# Patient Record
Sex: FEMALE | Race: WHITE
[De-identification: names, ages, dates, MRNs, and addresses within clinical notes are randomized per-mention and may not be internally consistent; named-entity substitution may affect disease eponyms.]

---

## 2021-09-17 ENCOUNTER — HOSPITAL ENCOUNTER (OUTPATIENT)
Dept: HOSPITAL 61 - 3 SO LND | Age: 31
Setting detail: OBSERVATION
Discharge: HOME | End: 2021-09-17
Attending: OBSTETRICS & GYNECOLOGY | Admitting: OBSTETRICS & GYNECOLOGY
Payer: MEDICAID

## 2021-09-17 DIAGNOSIS — Z3A.23: ICD-10-CM

## 2021-09-17 DIAGNOSIS — R19.7: ICD-10-CM

## 2021-09-17 DIAGNOSIS — Z20.822: ICD-10-CM

## 2021-09-17 DIAGNOSIS — R51.9: ICD-10-CM

## 2021-09-17 DIAGNOSIS — O26.892: Primary | ICD-10-CM

## 2021-09-17 LAB
AMORPH SED URNS QL MICRO: PRESENT /HPF
AMPHETAMINE/METHAMPHETAMINE: (no result)
APTT PPP: YELLOW S
BACTERIA #/AREA URNS HPF: (no result) /HPF
BARBITURATES UR-MCNC: (no result) UG/ML
BENZODIAZ UR-MCNC: (no result) UG/L
BILIRUB UR QL STRIP: NEGATIVE
CANNABINOIDS UR-MCNC: (no result) UG/L
COCAINE UR-MCNC: (no result) NG/ML
FIBRINOGEN PPP-MCNC: (no result) MG/DL
METHADONE SERPL-MCNC: (no result) NG/ML
NITRITE UR QL STRIP: NEGATIVE
OPIATES UR-MCNC: (no result) NG/ML
PCP SERPL-MCNC: (no result) MG/DL
PH UR STRIP: 8 [PH]
PROT UR STRIP-MCNC: NEGATIVE MG/DL
RBC #/AREA URNS HPF: 0 /HPF (ref 0–2)
UROBILINOGEN UR-MCNC: 1 MG/DL
WBC #/AREA URNS HPF: (no result) /HPF (ref 0–4)

## 2021-09-17 PROCEDURE — U0003 INFECTIOUS AGENT DETECTION BY NUCLEIC ACID (DNA OR RNA); SEVERE ACUTE RESPIRATORY SYNDROME CORONAVIRUS 2 (SARS-COV-2) (CORONAVIRUS DISEASE [COVID-19]), AMPLIFIED PROBE TECHNIQUE, MAKING USE OF HIGH THROUGHPUT TECHNOLOGIES AS DESCRIBED BY CMS-2020-01-R: HCPCS

## 2021-09-17 PROCEDURE — G0378 HOSPITAL OBSERVATION PER HR: HCPCS

## 2021-09-17 PROCEDURE — G0379 DIRECT REFER HOSPITAL OBSERV: HCPCS

## 2021-09-17 PROCEDURE — 80307 DRUG TEST PRSMV CHEM ANLYZR: CPT

## 2021-09-17 PROCEDURE — 59025 FETAL NON-STRESS TEST: CPT

## 2021-09-17 PROCEDURE — 96365 THER/PROPH/DIAG IV INF INIT: CPT

## 2021-09-17 PROCEDURE — 81001 URINALYSIS AUTO W/SCOPE: CPT

## 2021-09-17 PROCEDURE — 87086 URINE CULTURE/COLONY COUNT: CPT

## 2021-09-17 PROCEDURE — 87426 SARSCOV CORONAVIRUS AG IA: CPT

## 2021-10-08 ENCOUNTER — HOSPITAL ENCOUNTER (EMERGENCY)
Dept: HOSPITAL 61 - ER | Age: 31
Discharge: HOME | End: 2021-10-08
Payer: MEDICAID

## 2021-10-08 VITALS — DIASTOLIC BLOOD PRESSURE: 61 MMHG | SYSTOLIC BLOOD PRESSURE: 100 MMHG

## 2021-10-08 VITALS — HEIGHT: 64 IN | WEIGHT: 123.46 LBS | BODY MASS INDEX: 21.08 KG/M2

## 2021-10-08 DIAGNOSIS — Z3A.24: ICD-10-CM

## 2021-10-08 DIAGNOSIS — E86.0: ICD-10-CM

## 2021-10-08 DIAGNOSIS — O99.282: Primary | ICD-10-CM

## 2021-10-08 LAB
ALBUMIN SERPL-MCNC: 2.5 G/DL (ref 3.4–5)
ALBUMIN/GLOB SERPL: 0.7 {RATIO} (ref 1–1.7)
ALP SERPL-CCNC: 107 U/L (ref 46–116)
ALT SERPL-CCNC: 19 U/L (ref 14–59)
AMPHETAMINE/METHAMPHETAMINE: (no result)
ANION GAP SERPL CALC-SCNC: 15 MMOL/L (ref 6–14)
APTT PPP: YELLOW S
AST SERPL-CCNC: 12 U/L (ref 15–37)
BACTERIA #/AREA URNS HPF: 0 /HPF
BARBITURATES UR-MCNC: (no result) UG/ML
BASOPHILS # BLD AUTO: 0 X10^3/UL (ref 0–0.2)
BASOPHILS NFR BLD: 0 % (ref 0–3)
BENZODIAZ UR-MCNC: (no result) UG/L
BILIRUB SERPL-MCNC: 0.2 MG/DL (ref 0.2–1)
BILIRUB UR QL STRIP: NEGATIVE
BUN SERPL-MCNC: 4 MG/DL (ref 7–20)
BUN/CREAT SERPL: 5 (ref 6–20)
CALCIUM SERPL-MCNC: 8.4 MG/DL (ref 8.5–10.1)
CANNABINOIDS UR-MCNC: (no result) UG/L
CHLORIDE SERPL-SCNC: 102 MMOL/L (ref 98–107)
CO2 SERPL-SCNC: 20 MMOL/L (ref 21–32)
COCAINE UR-MCNC: (no result) NG/ML
CREAT SERPL-MCNC: 0.8 MG/DL (ref 0.6–1)
EOSINOPHIL NFR BLD: 0.1 X10^3/UL (ref 0–0.7)
EOSINOPHIL NFR BLD: 1 % (ref 0–3)
ERYTHROCYTE [DISTWIDTH] IN BLOOD BY AUTOMATED COUNT: 13.1 % (ref 11.5–14.5)
FIBRINOGEN PPP-MCNC: CLEAR MG/DL
GFR SERPLBLD BASED ON 1.73 SQ M-ARVRAT: 84.2 ML/MIN
GLUCOSE SERPL-MCNC: 123 MG/DL (ref 70–99)
HCT VFR BLD CALC: 28.4 % (ref 36–47)
HGB BLD-MCNC: 9.6 G/DL (ref 12–15.5)
LYMPHOCYTES # BLD: 1.8 X10^3/UL (ref 1–4.8)
LYMPHOCYTES NFR BLD AUTO: 18 % (ref 24–48)
MCH RBC QN AUTO: 29 PG (ref 25–35)
MCHC RBC AUTO-ENTMCNC: 34 G/DL (ref 31–37)
MCV RBC AUTO: 85 FL (ref 79–100)
METHADONE SERPL-MCNC: (no result) NG/ML
MONO #: 0.5 X10^3/UL (ref 0–1.1)
MONOCYTES NFR BLD: 5 % (ref 0–9)
NEUT #: 7.8 X10^3/UL (ref 1.8–7.7)
NEUTROPHILS NFR BLD AUTO: 76 % (ref 31–73)
NITRITE UR QL STRIP: NEGATIVE
OPIATES UR-MCNC: (no result) NG/ML
PCP SERPL-MCNC: (no result) MG/DL
PH UR STRIP: 6 [PH]
PLATELET # BLD AUTO: 351 X10^3/UL (ref 140–400)
POTASSIUM SERPL-SCNC: 3.5 MMOL/L (ref 3.5–5.1)
PROT SERPL-MCNC: 6.2 G/DL (ref 6.4–8.2)
PROT UR STRIP-MCNC: NEGATIVE MG/DL
RBC # BLD AUTO: 3.34 X10^6/UL (ref 3.5–5.4)
RBC #/AREA URNS HPF: 0 /HPF (ref 0–2)
SODIUM SERPL-SCNC: 137 MMOL/L (ref 136–145)
UROBILINOGEN UR-MCNC: 0.2 MG/DL
WBC # BLD AUTO: 10.3 X10^3/UL (ref 4–11)
WBC #/AREA URNS HPF: (no result) /HPF (ref 0–4)

## 2021-10-08 PROCEDURE — 81001 URINALYSIS AUTO W/SCOPE: CPT

## 2021-10-08 PROCEDURE — 85025 COMPLETE CBC W/AUTO DIFF WBC: CPT

## 2021-10-08 PROCEDURE — 80307 DRUG TEST PRSMV CHEM ANLYZR: CPT

## 2021-10-08 PROCEDURE — 99284 EMERGENCY DEPT VISIT MOD MDM: CPT

## 2021-10-08 PROCEDURE — 96361 HYDRATE IV INFUSION ADD-ON: CPT

## 2021-10-08 PROCEDURE — 93005 ELECTROCARDIOGRAM TRACING: CPT

## 2021-10-08 PROCEDURE — 80053 COMPREHEN METABOLIC PANEL: CPT

## 2021-10-08 PROCEDURE — 96360 HYDRATION IV INFUSION INIT: CPT

## 2021-10-08 PROCEDURE — 82962 GLUCOSE BLOOD TEST: CPT

## 2021-10-08 PROCEDURE — 36415 COLL VENOUS BLD VENIPUNCTURE: CPT

## 2021-10-08 NOTE — EKG
[facility]

              [address] [place] [address]

Test Date:    2021-10-08               Test Time:    15:07:36

Pat Name:     PADMINI SPAULDING          Department:   

Patient ID:   [id number]           Room:          

Gender:       F                        Technician:   

:          1990               Requested By: FLORENCE MARMOLEJO

Order Number: [id number]           Reading MD:   Dong Mccormack

                                 Measurements

Intervals                              Axis          

Rate:         110                      P:            26

ME:           140                      QRS:          64

QRSD:         70                       T:            35

QT:           326                                    

QTc:          447                                    

                           Interpretive Statements

SINUS TACHYCARDIA

Electronically Signed On 10- 16:47:42 CDT by Dong Mccormack

## 2021-10-08 NOTE — PHYS DOC
Past Medical History


Past Medical History:  No Pertinent History


Past Surgical History:  No Surgical History


Smoking Status:  Never Smoker


Alcohol Use:  None


Drug Use:  None





General Adult


EDM:


Chief Complaint:  SYNCOPE





HPI:


HPI:


30-year-old G3, P2 approximately 6 months pregnant female presents to the 

emergency department complaining of syncopal episode that occurred earlier 

today.  She states that she was walking after going to the bathroom when she 

passed out.  She states now that she feels weak.  There is no seizure-like 

activity witnessed.  Patient otherwise feels like in her normal health.  She 

denies any drugs or alcohol.  She moved from [place] and saw an OB/GYN in that 

state and states that she had a normal ultrasound and prenatal care prior to 

moving to [place].  The patient denies nausea, vomiting, fever, chills, chest 

pain, shortness of breath, abdominal pain, urinary symptoms, cough, recent 

trauma, or any other complaints.





Review of Systems:


Review of Systems:


ROS otherwise negative except for what was mentioned in HPI





Heart Score:


C/O Chest Pain:  No





Current Medications:





Current Medications








 Medications


  (Trade)  Dose


 Ordered  Sig/Lul  Start Time


 Stop Time Status Last Admin


Dose Admin


 


 Sodium Chloride  1,000 ml @ 


 1,000 mls/hr  1X  ONCE  10/8/21 15:30


 10/8/21 16:29   














Allergies:


Allergies:





Allergies








Coded Allergies Type Severity Reaction Last Updated Verified


 


  No Known Drug Allergies    9/17/21 No











Physical Exam:


PE:


Constitutional: Disheveled, no acute distress.


HENT: Atraumatic, bilateral external ears normal, nose normal.


Eyes: PERRLA, EOMI, conjunctiva normal, no discharge.


Neck: Normal range of motion, supple, no stridor.


Cardiovascular: Heart rate regular rhythm. 2+ radial pulses


Lungs & Thorax: No respiratory distress, symmetrical expansion.  Bilateral 

breath sounds clear to auscultation


Abdomen: Soft, no tenderness.  Gravid uterus


Skin: Warm, dry.


Extremities: No tenderness, no cyanosis, ROM intact, no edema.


Neurologic: Alert and oriented X 3, normal motor function, normal sensory 

function, no focal deficits noted. Non ataxic gait. GCS 15.


Psychologic: Affect normal, judgment normal, mood normal.





Current Patient Data:


Labs:





Laboratory Tests








Test


 10/8/21


15:12 10/8/21


15:22 10/8/21


16:32


 


White Blood Count


 10.3 x10^3/uL


(4.0-11.0) 


 





 


Red Blood Count


 3.34 x10^6/uL


(3.50-5.40) 


 





 


Hemoglobin


 9.6 g/dL


(12.0-15.5) 


 





 


Hematocrit


 28.4 %


(36.0-47.0) 


 





 


Mean Corpuscular Volume 85 fL ()   


 


Mean Corpuscular Hemoglobin 29 pg (25-35)   


 


Mean Corpuscular Hemoglobin


Concent 34 g/dL


(31-37) 


 





 


Red Cell Distribution Width


 13.1 %


(11.5-14.5) 


 





 


Platelet Count


 351 x10^3/uL


(140-400) 


 





 


Neutrophils (%) (Auto) 76 % (31-73)   


 


Lymphocytes (%) (Auto) 18 % (24-48)   


 


Monocytes (%) (Auto) 5 % (0-9)   


 


Eosinophils (%) (Auto) 1 % (0-3)   


 


Basophils (%) (Auto) 0 % (0-3)   


 


Neutrophils # (Auto)


 7.8 x10^3/uL


(1.8-7.7) 


 





 


Lymphocytes # (Auto)


 1.8 x10^3/uL


(1.0-4.8) 


 





 


Monocytes # (Auto)


 0.5 x10^3/uL


(0.0-1.1) 


 





 


Eosinophils # (Auto)


 0.1 x10^3/uL


(0.0-0.7) 


 





 


Basophils # (Auto)


 0.0 x10^3/uL


(0.0-0.2) 


 





 


Sodium Level


 137 mmol/L


(136-145) 


 





 


Potassium Level


 3.5 mmol/L


(3.5-5.1) 


 





 


Chloride Level


 102 mmol/L


() 


 





 


Carbon Dioxide Level


 20 mmol/L


(21-32) 


 





 


Anion Gap 15 (6-14)   


 


Blood Urea Nitrogen 4 mg/dL (7-20)   


 


Creatinine


 0.8 mg/dL


(0.6-1.0) 


 





 


Estimated GFR


(Cockcroft-Gault) 84.2 


 


 





 


BUN/Creatinine Ratio 5 (6-20)   


 


Glucose Level


 123 mg/dL


(70-99) 


 





 


Calcium Level


 8.4 mg/dL


(8.5-10.1) 


 





 


Total Bilirubin


 0.2 mg/dL


(0.2-1.0) 


 





 


Aspartate Amino Transf


(AST/SGOT) 12 U/L (15-37) 


 


 





 


Alanine Aminotransferase


(ALT/SGPT) 19 U/L (14-59) 


 


 





 


Alkaline Phosphatase


 107 U/L


() 


 





 


Total Protein


 6.2 g/dL


(6.4-8.2) 


 





 


Albumin


 2.5 g/dL


(3.4-5.0) 


 





 


Albumin/Globulin Ratio 0.7 (1.0-1.7)   


 


Ethyl Alcohol Level


 < 10 mg/dL


(0-10) 


 





 


Glucose (Fingerstick)


 


 114 mg/dL


(70-99) 





 


Urine Collection Type   Unknown 


 


Urine Color   Yellow 


 


Urine Clarity   Clear 


 


Urine pH   6.0 (<5.0-8.0) 


 


Urine Specific Gravity


 


 


 1.025


(1.000-1.030)


 


Urine Protein


 


 


 Negative mg/dL


(NEG-TRACE)


 


Urine Glucose (UA)


 


 


 Negative mg/dL


(NEG)


 


Urine Ketones (Stick)


 


 


 Negative mg/dL


(NEG)


 


Urine Blood   Negative (NEG) 


 


Urine Nitrite   Negative (NEG) 


 


Urine Bilirubin   Negative (NEG) 


 


Urine Urobilinogen Dipstick


 


 


 0.2 mg/dL (0.2


mg/dL)


 


Urine Leukocyte Esterase   Negative (NEG) 


 


Urine RBC   0 /HPF (0-2) 


 


Urine WBC   1-4 /HPF (0-4) 


 


Urine Squamous Epithelial


Cells 


 


 Mod /LPF 





 


Urine Bacteria   0 /HPF (0-FEW) 


 


Urine Mucus   Marked /LPF 


 


Urine Opiates Screen   Neg (NEG) 


 


Urine Methadone Screen   Neg (NEG) 


 


Urine Barbiturates   Neg (NEG) 


 


Urine Phencyclidine Screen   Neg (NEG) 


 


Urine


Amphetamine/Methamphetamine 


 


 Neg (NEG) 





 


Urine Benzodiazepines Screen   Neg (NEG) 


 


Urine Cocaine Screen   Neg (NEG) 


 


Urine Cannabinoids Screen   Neg (NEG) 


 


Urine Ethyl Alcohol   Neg (NEG) 








Vital Signs:





Vital Signs








  Date Time  Temp Pulse Resp B/P (MAP) Pulse Ox O2 Delivery O2 Flow Rate FiO2


 


10/8/21 16:04  88 16 108/66 (80) 98 Room Air  


 


10/8/21 15:34  88 16 110/74 (86) 98   


 


10/8/21 15:05 98.4 104  115/82 (93)    





 98.4       











Course & Med Decision Making:


Course & Med Decision Making


FHT: 142





patient much improved after 2L IVF, likely dehydrated, states she is going to 

follow up with tony randhawa, vitals improved, no bacteruria. will dc for 

patient to follow with tony. return precautions discussed. IUP seen on bedside 

US.








My Orders - BROWN,VANESSA M DO








Procedure Category Date Status





  Time 


 


Cbc W Autodiff LAB 10/8/21 Complete





  15:18 


 


Comprehensive LAB 10/8/21 Complete





Metabolic Panel  15:18 


 


Ua, Cult If Indicated LAB 10/8/21 Complete





  15:18 


 


Iv Normal Saline PHA 10/8/21 Complete





1000ml Bag (Iv Sodium  15:30 


 


Drugs Of Abuse Ur LAB 10/8/21 Complete





  15:21 


 


Ethanol LAB 10/8/21 Complete





  15:21 


 


Iv Normal Saline PHA 10/8/21 In Process





1000ml Bag (Iv Sodium  16:45 











Departure


Departure


Impression:  


   Primary Impression:  


   Dehydration


Disposition:  01 HOME / SELF CARE / HOMELESS


Condition:  STABLE


Referrals:  


NO PCP (PCP)


Patient Instructions:  Dehydration, Adult, Easy-to-Read





Additional Instructions:  


You were seen in the emergency department and your health condition was deemed 

not to require admission to the hospital.  It is important to realize that we 

can only evaluate you during the time that you are in her department.  

Occasionally health conditions can worsen upon leaving the emergency department.

 If this were to happen, please return to and allow us the opportunity to 

reevaluate you.  It is a pleasure to take care of your health needs.  Return to 

the ER if your symptoms worsen, do not improve, or if you develop additional 

symptoms that are concerning to you











VANESSA MACHUCA DO              Oct 8, 2021 15:24

## 2021-10-31 ENCOUNTER — HOSPITAL ENCOUNTER (EMERGENCY)
Dept: HOSPITAL 61 - ER | Age: 31
Discharge: HOME | End: 2021-10-31
Payer: MEDICAID

## 2021-10-31 VITALS
DIASTOLIC BLOOD PRESSURE: 83 MMHG | DIASTOLIC BLOOD PRESSURE: 83 MMHG | DIASTOLIC BLOOD PRESSURE: 83 MMHG | SYSTOLIC BLOOD PRESSURE: 134 MMHG | DIASTOLIC BLOOD PRESSURE: 83 MMHG | SYSTOLIC BLOOD PRESSURE: 134 MMHG | SYSTOLIC BLOOD PRESSURE: 134 MMHG | SYSTOLIC BLOOD PRESSURE: 134 MMHG

## 2021-10-31 VITALS — BODY MASS INDEX: 23.18 KG/M2 | HEIGHT: 64 IN | WEIGHT: 135.8 LBS

## 2021-10-31 DIAGNOSIS — K02.9: ICD-10-CM

## 2021-10-31 DIAGNOSIS — K00.7: Primary | ICD-10-CM

## 2021-10-31 DIAGNOSIS — K04.7: ICD-10-CM

## 2021-10-31 PROCEDURE — 99283 EMERGENCY DEPT VISIT LOW MDM: CPT

## 2021-10-31 NOTE — PHYS DOC
Past Medical History


Past Medical History:  Other


Additional Past Medical Histor:  fibromyalgia


Past Surgical History:  No Surgical History


Smoking Status:  Never Smoker


Alcohol Use:  None


Drug Use:  None





General Adult


EDM:


Chief Complaint:  DENTAL PROBLEM





HPI:


HPI:





Patient is a 30 year old A0 female who presents with 2-day history of right-

sided dental pain.  Patient rates her pain 10/10 radiating throughout the entire

right side of her face.  Patient states that she has had dental problems for 

several years, but has been a year since has been this bad.  Patient reports she

moved to the area 1 month ago from [facility].  Patient does not currently 

have dental care.  Patient denies fever, chills, eye pain, discharge, neck 

swelling, difficulty swallowing, difficulty breathing, chest pain, palpitations.

 Patient has no other complaints at this time.





Review of Systems:


Review of Systems:


12 systems reviewed.  ROS negative except as mentioned in HPI.





Heart Score:


C/O Chest Pain:  No





Current Medications:





Current Medications








 Medications


  (Trade)  Dose


 Ordered  Sig/Lul  Start Time


 Stop Time Status Last Admin


Dose Admin


 


 Acetaminophen


  (Tylenol)  650 mg  1X  ONCE  10/31/21 10:15


 10/31/21 10:16 DC 10/31/21 10:25


650 MG


 


 Lidocaine HCl


  (Viscous


 Lidocaine)  15 ml  1X  ONCE  10/31/21 10:15


 10/31/21 10:16 DC 10/31/21 10:25


15 ML











Allergies:


Allergies:





Allergies








Coded Allergies Type Severity Reaction Last Updated Verified


 


  No Known Drug Allergies    21 No











Physical Exam:


PE:





Constitutional: Well developed, well nourished, non-toxic appearance. 


HENT: Normocephalic, atraumatic, bilateral external ears normal, oropharynx 

moist, very poor dentition, right upper gums swollen and erythematous, nose 

normal.


Eyes: PERRLA, EOMI, conjunctiva normal, no discharge. 


Neck: Normal range of motion, no tenderness, supple, no stridor. 


Cardiovascular: Heart rate regular rhythm, no murmur.


Lungs & Thorax: Bilateral breath sounds clear to auscultation.





Current Patient Data:


Vital Signs:





                                   Vital Signs








  Date Time  Temp Pulse Resp B/P (MAP) Pulse Ox O2 Delivery O2 Flow Rate FiO2


 


10/31/21 09:57 97.4 74 17 134/83 (100) 98 Room Air  





 97.4       











Course & Med Decision Making:


Course & Med Decision Making


Pertinent Labs and Imaging studies reviewed. (See chart for details)





Patient's dental pain is chronic in nature.  At this time, there is no 

neck/throat or orbital involvement.  Due to patient's gravid status, pain 

management will be topical viscous lidocaine and Tylenol.





On reevaluation, patient states that her pain has improved. Patient will be 

provided with a list of dental clinics in the area.  Scripts were sent for 

viscous lidocaine as well as Augmentin.  Patient understands and is agreeable to

discharge plan.





Dragon Disclaimer:


Dragon Disclaimer:


This electronic medical record was generated, in whole or in part, using a voice

recognition dictation system.





Departure


Departure


Impression:  


   Primary Impression:  


   Dental abscess


   Additional Impressions:  


   Poor dentition


   Chronic enamel dental caries


Disposition:  01 HOME / SELF CARE / HOMELESS


Condition:  STABLE


Referrals:  


NO PCP (PCP)


Patient Instructions:  Dental Pain, Easy-to-Read





Additional Instructions:  


You may continue to take acetaminophen according to box instructions.  Do not 

take more than 3000 mg in a day.  Please return to the emergency department if 

your pain is uncontrolled at home.


Scripts


Benzocaine/Menthol/Zinc Chlor (Orajel 3X Toothache-Gum Gel) 11.9 Gm Gel..gram.


11.9 GM MM PRN, #1 BOTTLE


   Prov: FLORENCE MARMOLEJO         10/31/21 


Amoxicillin/Potassium Clav (AUGMENTIN 875-125 TABLET) 1 Each Tablet


1 TAB PO BID for 7 Days, #14 TAB 0 Refills


   Please take full course of antibiotics.


   Prov: FLORENCE MARMOLEJO         10/31/21











FLROENCE MARMOLEJO              Oct 31, 2021 10:47

## 2021-11-13 ENCOUNTER — HOSPITAL ENCOUNTER (OUTPATIENT)
Dept: HOSPITAL 61 - 3 SO LND | Age: 31
Setting detail: OBSERVATION
LOS: 1 days | Discharge: HOME | End: 2021-11-14
Attending: OBSTETRICS & GYNECOLOGY | Admitting: ADVANCED PRACTICE MIDWIFE
Payer: MEDICAID

## 2021-11-13 VITALS — BODY MASS INDEX: 23.79 KG/M2 | WEIGHT: 139.33 LBS | HEIGHT: 64 IN

## 2021-11-13 DIAGNOSIS — O99.891: Primary | ICD-10-CM

## 2021-11-13 DIAGNOSIS — Z79.899: ICD-10-CM

## 2021-11-13 DIAGNOSIS — Z3A.31: ICD-10-CM

## 2021-11-13 DIAGNOSIS — M54.50: ICD-10-CM

## 2021-11-13 DIAGNOSIS — R10.30: ICD-10-CM

## 2021-11-13 LAB
AMORPH SED URNS QL MICRO: PRESENT /HPF
AMPHETAMINE/METHAMPHETAMINE: (no result)
APTT PPP: YELLOW S
BACTERIA #/AREA URNS HPF: 0 /HPF
BARBITURATES UR-MCNC: (no result) UG/ML
BENZODIAZ UR-MCNC: (no result) UG/L
BILIRUB UR QL STRIP: NEGATIVE
CANNABINOIDS UR-MCNC: (no result) UG/L
COCAINE UR-MCNC: (no result) NG/ML
FIBRINOGEN PPP-MCNC: CLEAR MG/DL
METHADONE SERPL-MCNC: (no result) NG/ML
NITRITE UR QL STRIP: NEGATIVE
OPIATES UR-MCNC: (no result) NG/ML
PCP SERPL-MCNC: (no result) MG/DL
PH UR STRIP: 6 [PH]
PROT UR STRIP-MCNC: 30 MG/DL
RBC #/AREA URNS HPF: 0 /HPF (ref 0–2)
UROBILINOGEN UR-MCNC: 1 MG/DL
WBC #/AREA URNS HPF: (no result) /HPF (ref 0–4)

## 2021-11-13 PROCEDURE — 81001 URINALYSIS AUTO W/SCOPE: CPT

## 2021-11-13 PROCEDURE — G0379 DIRECT REFER HOSPITAL OBSERV: HCPCS

## 2021-11-13 PROCEDURE — G0378 HOSPITAL OBSERVATION PER HR: HCPCS

## 2021-11-13 PROCEDURE — 80307 DRUG TEST PRSMV CHEM ANLYZR: CPT

## 2021-11-13 PROCEDURE — 87086 URINE CULTURE/COLONY COUNT: CPT

## 2021-11-13 PROCEDURE — 59025 FETAL NON-STRESS TEST: CPT

## 2021-12-10 ENCOUNTER — HOSPITAL ENCOUNTER (OUTPATIENT)
Dept: HOSPITAL 61 - 3 SO LND | Age: 31
Setting detail: OBSERVATION
LOS: 1 days | Discharge: HOME | End: 2021-12-11
Attending: ADVANCED PRACTICE MIDWIFE | Admitting: ADVANCED PRACTICE MIDWIFE
Payer: MEDICAID

## 2021-12-10 ENCOUNTER — HOSPITAL ENCOUNTER (OUTPATIENT)
Dept: HOSPITAL 61 - 3 SO LND | Age: 31
Setting detail: OBSERVATION
Discharge: HOME | End: 2021-12-10
Attending: ADVANCED PRACTICE MIDWIFE | Admitting: ADVANCED PRACTICE MIDWIFE
Payer: MEDICAID

## 2021-12-10 DIAGNOSIS — O26.893: ICD-10-CM

## 2021-12-10 DIAGNOSIS — Z3A.35: ICD-10-CM

## 2021-12-10 DIAGNOSIS — R10.2: ICD-10-CM

## 2021-12-10 DIAGNOSIS — R10.30: ICD-10-CM

## 2021-12-10 DIAGNOSIS — O26.893: Primary | ICD-10-CM

## 2021-12-10 LAB
AMNIO PT: NEGATIVE
AMORPH SED URNS QL MICRO: PRESENT /HPF
APTT PPP: YELLOW S
BACTERIA #/AREA URNS HPF: (no result) /HPF
BILIRUB UR QL STRIP: NEGATIVE
FIBRINOGEN PPP-MCNC: CLEAR MG/DL
NITRITE UR QL STRIP: NEGATIVE
PH UR STRIP: 7 [PH]
PROT UR STRIP-MCNC: 30 MG/DL
RBC #/AREA URNS HPF: 0 /HPF (ref 0–2)
UROBILINOGEN UR-MCNC: 1 MG/DL
WBC #/AREA URNS HPF: (no result) /HPF (ref 0–4)

## 2021-12-10 PROCEDURE — 96361 HYDRATE IV INFUSION ADD-ON: CPT

## 2021-12-10 PROCEDURE — 59025 FETAL NON-STRESS TEST: CPT

## 2021-12-10 PROCEDURE — 84112 EVAL AMNIOTIC FLUID PROTEIN: CPT

## 2021-12-10 PROCEDURE — 81001 URINALYSIS AUTO W/SCOPE: CPT

## 2021-12-10 PROCEDURE — 96374 THER/PROPH/DIAG INJ IV PUSH: CPT

## 2021-12-10 PROCEDURE — G0379 DIRECT REFER HOSPITAL OBSERV: HCPCS

## 2021-12-10 PROCEDURE — 76819 FETAL BIOPHYS PROFIL W/O NST: CPT

## 2021-12-10 PROCEDURE — G0378 HOSPITAL OBSERVATION PER HR: HCPCS

## 2021-12-10 PROCEDURE — 76815 OB US LIMITED FETUS(S): CPT

## 2021-12-10 PROCEDURE — 87086 URINE CULTURE/COLONY COUNT: CPT

## 2021-12-10 PROCEDURE — 36415 COLL VENOUS BLD VENIPUNCTURE: CPT

## 2021-12-11 NOTE — RAD
INDICATION:  Reason: NEED BPP / Spl. Instructions:  / History:  Abdominal pain.



COMPARISON: One day prior



TECHNIQUE:  Focused ultrasound was performed of the uterus in order to obtain a fetal biophysical pro
file.  Please note that this is not a complete fetal anatomic survey.



FINDINGS: 



Fetal Breathin





Gross Body Movement: 2





Fetal Tone: 2





Amniotic Fluid Volume 2. 

Cephalic presentation at time of exam.

Amniotic fluid index is 11.8

Heart beat is 122



IMPRESSION: 



Fetal biophysical profile score is 6 out of 8.



Electronically signed by: Mathieu Calderón MD (2021 5:01 AM) [id number]

## 2021-12-11 NOTE — RAD
INDICATION:  Reason: ABDOMINAL PAIN / Spl. Instructions:  / History: 



COMPARISON: None.



FINDINGS:

Limited focused ultrasound images are obtained of the uterus.



Intrauterine pregnancy is seen with fetal movement and cardiac activity with heart beat of 135.

Cephalic presentation at time of exam.

Amniotic fluid index is 12.9.

Anterior placenta.

Estimated gestational age is 34 weeks and 6 days with estimated due date of 1/16/2022

Estimated fetal weight 2440 g, 33rd percentile

Head circumference is on the 23rd percentile

Abdominal circumference 29th percentile

Biparietal diameter 48 percent

Femur length 17 percent



IMPRESSION:



*   Intrauterine pregnancy with positive heartbeat and estimated gestational age of 34 weeks and 6 da
ys.



Electronically signed by: Mathieu Calderón MD (12/11/2021 3:04 AM) [id number]

## 2022-01-10 ENCOUNTER — HOSPITAL ENCOUNTER (INPATIENT)
Dept: HOSPITAL 61 - 3 SO LND | Age: 32
LOS: 2 days | Discharge: HOME | End: 2022-01-12
Attending: OBSTETRICS & GYNECOLOGY | Admitting: OBSTETRICS & GYNECOLOGY
Payer: SELF-PAY

## 2022-01-10 VITALS — DIASTOLIC BLOOD PRESSURE: 79 MMHG | SYSTOLIC BLOOD PRESSURE: 117 MMHG

## 2022-01-10 VITALS — BODY MASS INDEX: 25.78 KG/M2 | WEIGHT: 151.02 LBS | HEIGHT: 64 IN

## 2022-01-10 VITALS — SYSTOLIC BLOOD PRESSURE: 128 MMHG | DIASTOLIC BLOOD PRESSURE: 77 MMHG

## 2022-01-10 DIAGNOSIS — Z3A.39: ICD-10-CM

## 2022-01-10 DIAGNOSIS — Z3A.01: ICD-10-CM

## 2022-01-10 DIAGNOSIS — D64.9: ICD-10-CM

## 2022-01-10 DIAGNOSIS — O34.211: ICD-10-CM

## 2022-01-10 LAB
ALBUMIN SERPL-MCNC: 2.3 G/DL (ref 3.4–5)
ALBUMIN/GLOB SERPL: 0.5 {RATIO} (ref 1–1.7)
ALP SERPL-CCNC: 186 U/L (ref 46–116)
ALT SERPL-CCNC: 14 U/L (ref 14–59)
ANION GAP SERPL CALC-SCNC: 10 MMOL/L (ref 6–14)
ANISOCYTOSIS BLD QL SMEAR: PRESENT
APTT PPP: YELLOW S
AST SERPL-CCNC: 17 U/L (ref 15–37)
BACTERIA #/AREA URNS HPF: (no result) /HPF
BASOPHILS # BLD AUTO: 0 X10^3/UL (ref 0–0.2)
BASOPHILS NFR BLD: 0 % (ref 0–3)
BILIRUB SERPL-MCNC: 0.2 MG/DL (ref 0.2–1)
BILIRUB UR QL STRIP: NEGATIVE
BUN SERPL-MCNC: 12 MG/DL (ref 7–20)
BUN/CREAT SERPL: 20 (ref 6–20)
CALCIUM SERPL-MCNC: 8.1 MG/DL (ref 8.5–10.1)
CHLORIDE SERPL-SCNC: 103 MMOL/L (ref 98–107)
CO2 SERPL-SCNC: 22 MMOL/L (ref 21–32)
CREAT SERPL-MCNC: 0.6 MG/DL (ref 0.6–1)
CREATININE,RANDOM URINE: 116 MG/DL
EOSINOPHIL NFR BLD: 0.2 X10^3/UL (ref 0–0.7)
EOSINOPHIL NFR BLD: 2 % (ref 0–3)
ERYTHROCYTE [DISTWIDTH] IN BLOOD BY AUTOMATED COUNT: 18.3 % (ref 11.5–14.5)
FIBRINOGEN PPP-MCNC: CLEAR MG/DL
GFR SERPLBLD BASED ON 1.73 SQ M-ARVRAT: 116.6 ML/MIN
GLUCOSE SERPL-MCNC: 75 MG/DL (ref 70–99)
HCT VFR BLD CALC: 25 % (ref 36–47)
HGB BLD-MCNC: 8.3 G/DL (ref 12–15.5)
HYPOCHROMIA BLD QL SMEAR: PRESENT
LDH SERPL L TO P-CCNC: 197 U/L (ref 81–234)
LYMPHOCYTES # BLD: 2.2 X10^3/UL (ref 1–4.8)
LYMPHOCYTES NFR BLD AUTO: 22 % (ref 24–48)
MCH RBC QN AUTO: 24 PG (ref 25–35)
MCHC RBC AUTO-ENTMCNC: 33 G/DL (ref 31–37)
MCV RBC AUTO: 72 FL (ref 79–100)
MICROCYTES BLD QL SMEAR: (no result)
MONO #: 0.6 X10^3/UL (ref 0–1.1)
MONOCYTES NFR BLD: 6 % (ref 0–9)
NEUT #: 7 X10^3/UL (ref 1.8–7.7)
NEUTROPHILS NFR BLD AUTO: 70 % (ref 31–73)
NITRITE UR QL STRIP: NEGATIVE
PH UR STRIP: 6 [PH]
PLATELET # BLD AUTO: 262 X10^3/UL (ref 140–400)
PLATELET # BLD EST: ADEQUATE 10*3/UL
POTASSIUM SERPL-SCNC: 3.9 MMOL/L (ref 3.5–5.1)
PROT SERPL-MCNC: 6.5 G/DL (ref 6.4–8.2)
PROT UR STRIP-MCNC: NEGATIVE MG/DL
RBC # BLD AUTO: 3.48 X10^6/UL (ref 3.5–5.4)
RBC #/AREA URNS HPF: 0 /HPF (ref 0–2)
SODIUM SERPL-SCNC: 135 MMOL/L (ref 136–145)
URATE SERPL-MCNC: 3.4 MG/DL (ref 2.6–6)
UROBILINOGEN UR-MCNC: 0.2 MG/DL
WBC # BLD AUTO: 10 X10^3/UL (ref 4–11)
WBC #/AREA URNS HPF: (no result) /HPF (ref 0–4)

## 2022-01-10 PROCEDURE — 87086 URINE CULTURE/COLONY COUNT: CPT

## 2022-01-10 PROCEDURE — 36415 COLL VENOUS BLD VENIPUNCTURE: CPT

## 2022-01-10 PROCEDURE — 84550 ASSAY OF BLOOD/URIC ACID: CPT

## 2022-01-10 PROCEDURE — 82570 ASSAY OF URINE CREATININE: CPT

## 2022-01-10 PROCEDURE — 86592 SYPHILIS TEST NON-TREP QUAL: CPT

## 2022-01-10 PROCEDURE — 81001 URINALYSIS AUTO W/SCOPE: CPT

## 2022-01-10 PROCEDURE — 86762 RUBELLA ANTIBODY: CPT

## 2022-01-10 PROCEDURE — 86850 RBC ANTIBODY SCREEN: CPT

## 2022-01-10 PROCEDURE — 86901 BLOOD TYPING SEROLOGIC RH(D): CPT

## 2022-01-10 PROCEDURE — 86703 HIV-1/HIV-2 1 RESULT ANTBDY: CPT

## 2022-01-10 PROCEDURE — 86803 HEPATITIS C AB TEST: CPT

## 2022-01-10 PROCEDURE — G0378 HOSPITAL OBSERVATION PER HR: HCPCS

## 2022-01-10 PROCEDURE — 85027 COMPLETE CBC AUTOMATED: CPT

## 2022-01-10 PROCEDURE — 87340 HEPATITIS B SURFACE AG IA: CPT

## 2022-01-10 PROCEDURE — 83615 LACTATE (LD) (LDH) ENZYME: CPT

## 2022-01-10 PROCEDURE — 85025 COMPLETE CBC W/AUTO DIFF WBC: CPT

## 2022-01-10 PROCEDURE — 86900 BLOOD TYPING SEROLOGIC ABO: CPT

## 2022-01-10 PROCEDURE — 86787 VARICELLA-ZOSTER ANTIBODY: CPT

## 2022-01-10 PROCEDURE — 80053 COMPREHEN METABOLIC PANEL: CPT

## 2022-01-10 PROCEDURE — 84156 ASSAY OF PROTEIN URINE: CPT

## 2022-01-10 RX ADMIN — DOCUSATE SODIUM PRN MG: 100 CAPSULE, LIQUID FILLED ORAL at 20:53

## 2022-01-10 RX ADMIN — OXYCODONE HYDROCHLORIDE AND ACETAMINOPHEN PRN TAB: 5; 325 TABLET ORAL at 20:54

## 2022-01-10 RX ADMIN — IBUPROFEN PRN MG: 400 TABLET ORAL at 16:48

## 2022-01-10 RX ADMIN — OXYCODONE HYDROCHLORIDE AND ACETAMINOPHEN PRN TAB: 5; 325 TABLET ORAL at 16:48

## 2022-01-10 NOTE — PDOC4
VAGINAL DELIVERY


DATE


DATE: 1/10/22 


TIME: 14:18


TIME


Patient delivered a viable male infant over intact perineum at 1350.  


Wt 6 lb 10.7 oz. Apgars 8/9.  Placenta delivered spontaneously, intact 


with 3VC.  No lacerations noted.  Good hemostasis noted.  20 U 


of Pit given with IVF.   cc.


WEIGHT


Weight [ ]











TESSA SULLIVAN MD             Dwaine 10, 2022 14:19

## 2022-01-10 NOTE — PDOC1
OB/GYN H&P


Date of Admission:


Date of Admission:  Dwaine 10, 2022 at 05:34





History of Present Illness:


EDC: 22


LMP: 21





31y  @ 39.4 by L=13 presents to L&D for scheduled indxn.  The pt began 

her care in [place].  She moved to  in the third trimester.  Her C/S was in [place] for 

breech. She was oligio at 36wks. The pt denies HA, changes in vision, or abd 

pain.





PMH: Herniated disc, Annular Tear in spine


PSH: 


Meds: PNV, Tums


All: NKDA


OBHx: TSVD, 36wk C/S


SH: no tob, no EtOH


FH: noncontributory





Medications:


Meds:





Current Medications








 Medications


  (Trade)  Dose


 Ordered  Sig/Lul


 Route


 PRN Reason  Start Time


 Stop Time Status Last Admin


Dose Admin


 


 Ringer's Solution  1,000 ml @ 


 125 mls/hr  Q8H


 IV


   1/10/22 05:45


    1/10/22 07:05





 


 Oxytocin  500 ml @ 0


 mls/hr  CONT  PRN


 IV


 SEE I/O RECORD  1/10/22 05:45


    1/10/22 07:05














Allergies:


Coded Allergies:  


     No Known Drug Allergies (Unverified , 21)





Physical Exam:


PE:


GENERAL:       No apparent distress. Alert and oriented.


HEENT:            Head normocephalic, atraumatic.


NECK:              Supple


LUNGS:            Clear to auscultation.


HEART:            RRR, S1, S2 present, pulses intact


ABDOMEN:       Soft, positive bowel sounds.


EXTREMITIES:  No cyanosis or edema.


NEUROLOGIC:  Normal speech, normal tone


PSYCHIATRIC:  Normal affect, normal mood.


SKIN:                No ulceration.





FHT: 120s +acels/no decels/mLTV


[place]: 1-2 min


SVE: 3-4/70/-3


Labs:





                                Laboratory Tests








Test


 1/10/22


06:10


 


White Blood Count


 10.0 x10^3/uL


(4.0-11.0)


 


Red Blood Count


 3.48 x10^6/uL


(3.50-5.40)  L


 


Hemoglobin


 8.3 g/dL


(12.0-15.5)  L


 


Hematocrit


 25.0 %


(36.0-47.0)  L


 


Mean Corpuscular Volume


 72 fL ()


L


 


Mean Corpuscular Hemoglobin


 24 pg (25-35)


L


 


Mean Corpuscular Hemoglobin


Concent 33 g/dL


(31-37)


 


Red Cell Distribution Width


 18.3 %


(11.5-14.5)  H


 


Platelet Count


 262 x10^3/uL


(140-400)


 


Neutrophils (%) (Auto) 70 % (31-73)  


 


Lymphocytes (%) (Auto) 22 % (24-48)  L


 


Monocytes (%) (Auto) 6 % (0-9)  


 


Eosinophils (%) (Auto) 2 % (0-3)  


 


Basophils (%) (Auto) 0 % (0-3)  


 


Neutrophils # (Auto)


 7.0 x10^3/uL


(1.8-7.7)


 


Lymphocytes # (Auto)


 2.2 x10^3/uL


(1.0-4.8)


 


Monocytes # (Auto)


 0.6 x10^3/uL


(0.0-1.1)


 


Eosinophils # (Auto)


 0.2 x10^3/uL


(0.0-0.7)


 


Basophils # (Auto)


 0.0 x10^3/uL


(0.0-0.2)


 


Platelet Estimate Pending  





                                Laboratory Tests


1/10/22 06:10








                                Laboratory Tests


1/10/22 06:10











Assessment & Plan:





A/P 31y  @ 39.4 by L=13


1.) Indxn  on Pit


2.) TOB - [place], records not available, drop in labs ordered


3.) Prev C/S x 1 - desires TOLAC


4.) Low-lying placenta - resolved 12/10/21 (anterior placenta)


5.) Elevated BP  BP's nml to mild since admission, no s/s of preeclampsia, PIH 

labs ordered


6.) Fetus  cat I FHT


7.) GBS neg











TESSA SULLIVAN MD             Dwaine 10, 2022 10:01

## 2022-01-11 VITALS — DIASTOLIC BLOOD PRESSURE: 84 MMHG | SYSTOLIC BLOOD PRESSURE: 124 MMHG

## 2022-01-11 VITALS — SYSTOLIC BLOOD PRESSURE: 125 MMHG | DIASTOLIC BLOOD PRESSURE: 76 MMHG

## 2022-01-11 VITALS — DIASTOLIC BLOOD PRESSURE: 88 MMHG | SYSTOLIC BLOOD PRESSURE: 126 MMHG

## 2022-01-11 VITALS — SYSTOLIC BLOOD PRESSURE: 144 MMHG | DIASTOLIC BLOOD PRESSURE: 86 MMHG

## 2022-01-11 VITALS — SYSTOLIC BLOOD PRESSURE: 138 MMHG | DIASTOLIC BLOOD PRESSURE: 84 MMHG

## 2022-01-11 VITALS — SYSTOLIC BLOOD PRESSURE: 122 MMHG | DIASTOLIC BLOOD PRESSURE: 77 MMHG

## 2022-01-11 LAB
ERYTHROCYTE [DISTWIDTH] IN BLOOD BY AUTOMATED COUNT: 19 % (ref 11.5–14.5)
HCT VFR BLD CALC: 23 % (ref 36–47)
HGB BLD-MCNC: 7.4 G/DL (ref 12–15.5)
MCH RBC QN AUTO: 23 PG (ref 25–35)
MCHC RBC AUTO-ENTMCNC: 32 G/DL (ref 31–37)
MCV RBC AUTO: 72 FL (ref 79–100)
PLATELET # BLD AUTO: 205 X10^3/UL (ref 140–400)
RBC # BLD AUTO: 3.2 X10^6/UL (ref 3.5–5.4)
WBC # BLD AUTO: 9.5 X10^3/UL (ref 4–11)

## 2022-01-11 RX ADMIN — IBUPROFEN PRN MG: 400 TABLET ORAL at 17:48

## 2022-01-11 RX ADMIN — Medication SCH MG: at 17:52

## 2022-01-11 RX ADMIN — IBUPROFEN PRN MG: 400 TABLET ORAL at 01:38

## 2022-01-11 RX ADMIN — IBUPROFEN PRN MG: 400 TABLET ORAL at 09:24

## 2022-01-11 RX ADMIN — Medication SCH TAB: at 07:59

## 2022-01-11 RX ADMIN — Medication SCH MG: at 07:59

## 2022-01-11 RX ADMIN — OXYCODONE HYDROCHLORIDE AND ACETAMINOPHEN PRN TAB: 5; 325 TABLET ORAL at 04:38

## 2022-01-11 NOTE — NUR
SS following up with referral regarding homelessness. SS reviewed mother and infant chart 
and spoke with RN yesterday, 1/10/2022. SS met with father of baby in room yesterday. Mother 
was in the bathroom with RN at the time of meeting. Father of baby reported that they have 
been working with DCF and [employer] program and are being provided services. He provided contact 
information for DCF worker, Eva Martin, [phone], and [employer] worker, Bre Venegas, 
[phone]. SS made contact with [employer] worker yesterday. [employer] is providing services in the 
community for mother and father. [employer] is assisting with WIC referral and have provided car 
seat for infant. SS made contact with DCF worker, Eva Martin, this morning. Eva 
confirmed parents report that they will be staying with father of infant's family until 
housing can be arranged. DCF assisting family with finding housing. DCF providing family 
with pack n play. DCF providing referral for family preservation services in the home. DCF 
stating that at this time there is no need to complete DCF hotline since services are 
already in place. DCF reported that infant is safe to discharge with parents when ready. 
Infant RN updated. SS will continue to follow as needed.

## 2022-01-12 VITALS — DIASTOLIC BLOOD PRESSURE: 70 MMHG | SYSTOLIC BLOOD PRESSURE: 108 MMHG

## 2022-01-12 VITALS — SYSTOLIC BLOOD PRESSURE: 128 MMHG | DIASTOLIC BLOOD PRESSURE: 87 MMHG

## 2022-01-12 VITALS — SYSTOLIC BLOOD PRESSURE: 111 MMHG | DIASTOLIC BLOOD PRESSURE: 68 MMHG

## 2022-01-12 RX ADMIN — IBUPROFEN PRN MG: 400 TABLET ORAL at 02:00

## 2022-01-12 RX ADMIN — DOCUSATE SODIUM PRN MG: 100 CAPSULE, LIQUID FILLED ORAL at 09:22

## 2022-01-12 RX ADMIN — Medication SCH MG: at 09:22

## 2022-01-12 RX ADMIN — Medication SCH TAB: at 09:22

## 2022-01-12 NOTE — PDOC
OB/GYN PROGRESS NOTE


Date of Service:


DATE: 22 


TIME: 12:01





Subjective:


Pt with good pain control.  Ang PO.  Voiding.  Minimal lochia





Objective:


Vital Signs:





Vital Signs








  Date Time  Temp Pulse Resp B/P (MAP) Pulse Ox O2 Delivery O2 Flow Rate FiO2


 


22 08:10 97.7 87 16 138/84 (102) 97 Room Air  





 97.7       








Vital Signs








  Date Time  Temp Pulse Resp B/P (MAP) Pulse Ox O2 Delivery O2 Flow Rate FiO2


 


22 08:04 97.9 74 16 128/87 (101)  Room Air  





 97.9       


 


22 06:11     100   











Physical Exam:


GENERAL:       No apparent distress. Alert and oriented.


HEENT:            Head normocephalic, atraumatic.


NECK:              Supple


LUNGS:            Clear to auscultation.


HEART:            RRR, S1, S2 present, pulses intact


ABDOMEN:       Soft, positive bowel sounds.


EXTREMITIES:  No cyanosis or edema.


NEUROLOGIC:  Normal speech, normal tone


PSYCHIATRIC:  Normal affect, normal mood.


SKIN:                No ulceration.





FFNT below umb


No C/C/E





Assessment & Plan:





A/P 31y  PPD #1 s/p 


1.) PP  doing well


2.) [place] - [place], [place], drop in labs wnl


3.) Preeclampsia  based on BP and urine Pr/Cr of 0.374, [facility] labs wnl


4.) Anemia  Hgb 8.3 -> 7.4, on Fe BID


5.) Cont PP TESSA Leone MD             2022 12:02

## 2022-01-12 NOTE — NUR
dismissed amb to fob in car with baby in car seat . Stable on feet. ss consult has been 
done. Rx for colace motrin and iron given to pt along with home care instructions copies. 
Home care instructions done

## 2022-01-12 NOTE — NUR
SS received phone contact from DCF worker, Eva Martin, [phone], stating that 
parents completed referral for [facility] and have been 
expedited to the top of the list. Eva stated that parents should have new housing next 
week.

## 2022-01-15 NOTE — DS
DATE OF DISCHARGE: 2022

ADMISSION DIAGNOSES:

1.  Intrauterine pregnancy at 39 weeks and 4 days by LMP equal to 13-week 

ultrasound.

2.  Induction.

3.  Previous  section x 1, desires trial of labor.

4.  Low lying placenta, resolved.

5.  Gestational hypertension.

6.  GBS negative.



DISCHARGE DIAGNOSES:

1.  Intrauterine pregnancy at 39 weeks and 4 days by LMP equal to 13-week 

ultrasound.

2.  Induction.

3.  Previous  section x 1, desires trial of labor.

4.  Low lying placenta, resolved.

5.  Gestational hypertension.

6.  GBS negative.



PROCEDURE:  Vaginal birth after .



BRIEF HOSPITAL COURSE:  The patient is a 31-year-old  3, para 2-0-0-2, 

who presented to Labor and Delivery at 39 weeks and 4 days by LMP equal to 

13-week ultrasound for scheduled induction.  The patient initiated her care in 

[place], but moved to [place] in the third trimester.  The patient had a 

 with her second pregnancy in [place] for the baby being breech and 

oligo at 36 weeks.  When the patient arrived, she had an elevated blood 

pressure.  She denied any signs or symptoms of preeclampsia.  Her initial exam 

was 3-4 cm dilated and was started on Pitocin.  The patient ultimately delivered

later that afternoon by vaginal birth.  See delivery note for full detail.  By 

postpartum day #2, the patient was meeting all discharge criteria and 

subsequently discharged home.  Of note, her hemoglobin on admission was 8.3 and 

after delivery, was found to be 7.4.



DISCHARGE INSTRUCTIONS:  The patient was told not to lift anything greater than 

20 pounds, have pelvic rest for 6 weeks and not to drive on narcotics.



CALL IF:  The patient was to call if she had fevers, chills, nausea, vomiting, 

abdominal pain or any additional questions or concerns.



FOLLOWUP APPOINTMENT:  The patient was to follow up on 2022 at 9:00 a.m. 

at [facility] for a postpartum visit.



DISCHARGE MEDICATIONS:  The patient was given a prescription for Motrin 800 mg, 

30 pills; Colace 100 mg, 30 pills and ferrous sulfate 325 mg, 30 pills.







VALERY

DR: Brooke   DD: 01/15/2022 11:46

DT: 01/15/2022 12:04   TID: [id number]